# Patient Record
Sex: MALE | Race: WHITE
[De-identification: names, ages, dates, MRNs, and addresses within clinical notes are randomized per-mention and may not be internally consistent; named-entity substitution may affect disease eponyms.]

---

## 2017-06-30 NOTE — EMERGENCY ROOM VISIT NOTE
ED Visit Note


First contact with patient:  17:26


CHIEF COMPLAINT:  Toothache 








HISTORY OF PRESENT ILLNESS:  This 36-year-old male patient presented to the 

emergency department ambulatory with a progressive toothache for past several 

weeks but has been worsening and he has had some facial swelling.  The patient 

believes it is coming from right upper molar.  The pain is now steady and 

severe and radiates to the face.  The patient does not a dentist appointment 

set up and states he does not have insurance or dentist.  They rate their pain 

a 8/10 and the ibuprofen and Tylenol they have been taking has not relieved the 

pain.    The patient denies any discharge from the mouth.





 


REVIEW OF SYSTEMS:   A 10 system review of systems was completed with positives 

and pertinent negatives listed in the HPI.  








ALLERGIES: Penicillins, calamine, bee sting








MEDICATIONS: Patient denies








PMH: Patient denies








SOCIAL HISTORY: The patient lives locally.  He is employed








PHYSICAL EXAM: Vitals are noted on the nurse's note and reviewed by myself.  

Vital signs stable.  Temperature 36.6C orally.  GENERAL: This is a 36-year-old 

male, in no acute distress, nondiaphoretic, well-developed well-nourished.   

Mouth:  The right upper tooth is very carious and the gum is swollen and tender 

around it, without any discharge or signs of an abscess.  The remainder of the 

pharynx and tonsils are without erythema, edema, or exudate.  The airway is 

patent.  There is no facial swelling, cervical or submandibular 

lymphadenopathy.  The patient appears uncomfortable and in pain.  The patient 

has overall very poor dental hygiene.





ED COURSE: The patient was seen and examined.  Previous visits were reviewed.  

The patient has very poor dentition.  He does have some mild facial swelling.  

He is afebrile and nontoxic in appearance.  There is no swelling beneath the 

tongue to suggest Hernandez's angina.  The patient will be given prescriptions for 

clindamycin and Norco.  He was given information for [facility] in 

medicine, Dr. Richter and Dr. Goldberg.  He should follow-up with the dentist for 

definitive management.  He should return to the ER with any worsening symptoms.





    


DIAGNOSIS:  Odontalgia








DISCHARGE INSTRUCTIONS & TREATMENT: Clindamycin 4 times daily for 10 days.Norco 

one tablet every 6 hours if needed for worse pain.  Do not drink or drive while 

taking Norco and do not take with Tylenol.  Followup with a dentist for 

definitive management of your tooth.  Return with high fevers, worsening pain 

or swelling.


Current/Historical Medications


Scheduled


Clindamycin Hcl (Cleocin), 300 MG PO QID





Scheduled PRN


Hydrocodone/Acetaminophen 5MG/325MG (Norco 5MG/325MG), 1 TABLET PO Q6 PRN for 

Pain


Ibuprofen (Advil), 200-600 MG PO Q4H PRN for Pain





Allergies


Coded Allergies:  


     Calamine (Verified  Allergy, Mild, WORSENING RASH, 11/2/16)


     Penicillins (Unverified  Allergy, Mild, 11/2/16)


     BEE STING (Verified  Allergy, Unknown, UNKNOWN, 11/2/16)





Vital Signs











  Date Time  Temp Pulse Resp B/P (MAP) Pulse Ox O2 Delivery O2 Flow Rate FiO2


 


6/30/17 18:00  86 18 150/85 97   


 


6/30/17 17:19 36.6 82 18 145/91 96 Room Air  











Departure Information


Impression





 Primary Impression:  


 Dental caries


 Additional Impression:  


 Dental abscess





Dispostion


Home / Self-Care





Condition


GOOD





Prescriptions





Clindamycin Hcl (CLEOCIN) 300 Mg Cap


300 MG PO QID for 10 Days, #40 CAP


   Prov: Ivelisse Chand PA-C         6/30/17 


Hydrocodone/Acetaminophen 5MG/325MG (Norco 5MG/325MG)  Tab


1 TABLET PO Q6 Y for Pain, #12 TAB


   For Initial Treatment


   Prov: Ivelisse Chand PA-C         6/30/17





Referrals


Reny Talaamntes D.O. (PCP)








Nitesh Goldberg M.D.





Patient Instructions


[facility]





Additional Instructions





Clindamycin 4 times daily for 10 days.Norco one tablet every 6 hours if needed 

for worse pain.  Do not drink or drive while taking Norco and do not take with 

Tylenol.  Followup with a dentist for definitive management of your tooth.  

Return with high fevers, worsening pain or swelling.





Problem Qualifiers

## 2017-07-07 NOTE — DIAGNOSTIC IMAGING REPORT
CHEST 2 VIEWS ROUTINE



CLINICAL HISTORY: Left-sided chest pain    



COMPARISON STUDY:  3/13/2012



FINDINGS: The cardiac and mediastinal contours are normal. There is no evidence

of focal pulmonary consolidation. There is no evidence of failure. No pleural

effusions are visualized.[ No pneumothorax is visualized.



IMPRESSION: No active disease in the chest.







Electronically signed by:  Rafael Shah M.D.

7/7/2017 6:21 PM



Dictated Date/Time:  7/7/2017 6:20 PM

## 2017-07-07 NOTE — EMERGENCY ROOM VISIT NOTE
ED Visit Note


First contact with patient:  17:12


CHIEF COMPLAINT: Left-sided chest pain times one day





HISTORY OF PRESENT ILLNESS: Patient is an otherwise healthy 36-year-old white 

male who presents to the emergency department for evaluation of left-sided 

chest pain.  He reports that his pain started abruptly yesterday around noon.  

He states that he suddenly developed some left-sided nasal congestion and a 

runny nose.  He also developed a dull, constant, aching left-sided chest pain.  

It hurts to the touch also hurts with coughing and deep breathing.  He has had 

a slight cough.  He denies any shortness of breath.  He did not take any 

medications, nor perform any interventions for his symptoms.  He denies any 

direct trauma to the area, no unusual activity or heavy lifting which could 

have caused his symptoms.  He feels sweaty and clammy at times, but denies any 

headache, lightheadedness, dizziness, nausea or vomiting.  Pain does not 

radiate to the back, the jaw or the shoulder.  He has been on clindamycin for 

about a week for a dental abscess which she states is improving.





REVIEW OF SYSTEMS:Review of systems as per HPI.  All other systems reviewed 

were negative.  10 systems reviewed.





PMH:  Electronic medical records are reviewed and summarized as above/below.  

See Problem List.





SOCIAL HISTORY:  Patient lives at home.  He is employed.  Smoker.   





PHYSICAL EXAM: Vital Signs: 


CONSTITUTIONAL: Patient is a well-appearing 36-year-old white male who is awake 

and alert and in no acute distress.  Vital signs are stable.


EYES:  Pupils equal, round, reactive to light and accommodation.  EOMs intact 

without nystagmus.  Sclera are anicteric. 


ENT:  Tympanic membranes intact, with normal landmarks.  External canals are 

clear.  Oral and nasopharynx are clear.  Mucous membranes are moist, no lesions

, tongue and gums appear normal. Poor dentition.


NECK: Supple without lymphadenopathy.  No thyromegaly.  No meningeal signs.  

Full active range of motion without discomfort.


CARDIOVASCULAR: Regular rate and rhythm, with normal S1 and S2, no murmur or 

gallop or rub is heard.  No carotid bruits auscultated.  No JVD.  Peripheral 

pulses easily palpable.  He has reproducible discomfort to palpation over the 

left costochondral border.


RESPIRATORY: Breath sounds equal and clear to auscultation without wheezes, 

rales, or rhonchi heard.   Full and equal chest expansion without accessory 

muscle use or retractions. 


ABDOMEN: Bowel sounds are present.  Abdomen is soft, nontender and nondistended.


INTEGUMENTARY: No lesions or rash, normal skin turgor. 


LYMPH: No lymphadenopathy.





EMERGENCY DEPARTMENT COURSE: Patient was seen and assessed as above.  His old 

records are reviewed.  IV lock was initiated and he was medicated with Toradol 

30 mg IV.  Chest x-ray was obtained and was unremarkable.  EKG was performed 

and was as noted below.  CBC with differential, sedimentation rate, coags, 

cardiac enzymes, CMP and point-of-care troponin and d-dimer were drawn.


Laboratory studies demonstrated a normal white count, H&H, platelet count and 

coags.  There is very slight slight elevation, nonspecific of his sedimentation 

rate.  Electrolytes, renal functions, liver functions are all within normal 

limits.  Troponin is negative 1 and Cardec enzymes are not elevated.  Point-of-

care d-dimer was normal, therefore further workup for PE was not pursued.





All laboratory and diagnostic imaging studies were reviewed with the patient.  

He has reproducible left chest wall tenderness, and I suspect his symptoms are 

likely musculoskeletal in nature.  Conservative care measures were discussed.  

He was encouraged to apply heat to the area and to take an anti-inflammatory 

medicine.  The patient rated his pain a 5/10 at discharge.





EKG: Normal sinus rhythm 76 beats per minute, no ectopy or acute ischemic 

changes, no change on review of prior EKGs.





CHEST 2 VIEWS ROUTINE





CLINICAL HISTORY: Left-sided chest pain    





COMPARISON STUDY:  3/13/2012





FINDINGS: The cardiac and mediastinal contours are normal. There is no evidence


of focal pulmonary consolidation. There is no evidence of failure. No pleural


effusions are visualized.[ No pneumothorax is visualized.





IMPRESSION: No active disease in the chest.








Differential diagnosis includes acute myocardial infarction, acute coronary 

syndrome, myocarditis, pericarditis, pericardial effusions /tamponade, 

esophageal perforation,   pulmonary embolism, pneumonia, pneumothorax, 

cardiomyopathy, congestive heart failure, anemia , COPD/asthma exacerbation, 

musculoskeletal, anxiety, costochondritis,.


Problem List


Medical Problems:


(1) Ankle pain


Status: Resolved  





(2) Bilateral calcaneal fractures


Status: Resolved  





(3) Bilateral calcaneal fractures


Status: Resolved  





(4) Contusion of foot


Status: Resolved  





(5) Dental abscess


Status: Resolved  





(6) Dental caries


Status: Resolved  





(7) Dental caries


Status: Resolved  





(8) Fall


Status: Resolved  





(9) Foot pain


Status: Resolved  





(10) Foot pain


Status: Resolved  





(11) Heel pain, bilateral


Status: Resolved  





(12) Leg pain, bilateral


Status: Resolved  





(13) Lumbar contusion


Status: Resolved  





(14) Lumbar contusion


Status: Resolved  





(15) Lumbar strain


Status: Resolved  





(16) Odontalgia


Status: Resolved  





(17) Osteopenia


Status: Resolved  





(18) Periapical abscess


Status: Resolved  





(19) Pre-syncope


Status: Resolved  





Surgical Problems:


(1) History of orthopedic surgery


Status: Resolved  











Current/Historical Medications


Scheduled


Clindamycin Hcl (Cleocin), 300 MG PO QID





Scheduled PRN


Ibuprofen (Advil), 200-600 MG PO Q4H PRN for Pain





Allergies


Coded Allergies:  


     Calamine (Verified  Allergy, Mild, WORSENING RASH, 7/7/17)


     Penicillins (Unverified  Allergy, Mild, 7/7/17)


     BEE STING (Verified  Allergy, Unknown, UNKNOWN, 7/7/17)





Vital Signs











  Date Time  Temp Pulse Resp B/P (MAP) Pulse Ox O2 Delivery O2 Flow Rate FiO2


 


7/7/17 19:20  78 18 129/86 98   


 


7/7/17 18:09  70      


 


7/7/17 16:51 36.6 99 20 131/94 97 Room Air  











Laboratory Results


7/7/17 17:50








Red Blood Count 4.89, Mean Corpuscular Volume 96.3, Mean Corpuscular Hemoglobin 

32.9, Mean Corpuscular Hemoglobin Concent 34.2, Mean Platelet Volume 10.7, 

Neutrophils (%) (Auto) 58.5, Lymphocytes (%) (Auto) 28.9, Monocytes (%) (Auto) 

10.5, Eosinophils (%) (Auto) 1.7, Basophils (%) (Auto) 0.4, Neutrophils # (Auto

) 4.85, Lymphocytes # (Auto) 2.39, Monocytes # (Auto) 0.87, Eosinophils # (Auto

) 0.14, Basophils # (Auto) 0.03





7/7/17 17:50

















Test


  7/7/17


17:50 7/7/17


17:54


 


White Blood Count


  8.28 K/uL


(4.8-10.8) 


 


 


Red Blood Count


  4.89 M/uL


(4.7-6.1) 


 


 


Hemoglobin


  16.1 g/dL


(14.0-18.0) 


 


 


Hematocrit 47.1 % (42-52)  


 


Mean Corpuscular Volume


  96.3 fL


() 


 


 


Mean Corpuscular Hemoglobin


  32.9 pg


(25-34) 


 


 


Mean Corpuscular Hemoglobin


Concent 34.2 g/dl


(32-36) 


 


 


Platelet Count


  222 K/uL


(130-400) 


 


 


Mean Platelet Volume


  10.7 fL


(7.4-10.4) 


 


 


Neutrophils (%) (Auto) 58.5 %  


 


Lymphocytes (%) (Auto) 28.9 %  


 


Monocytes (%) (Auto) 10.5 %  


 


Eosinophils (%) (Auto) 1.7 %  


 


Basophils (%) (Auto) 0.4 %  


 


Neutrophils # (Auto)


  4.85 K/uL


(1.4-6.5) 


 


 


Lymphocytes # (Auto)


  2.39 K/uL


(1.2-3.4) 


 


 


Monocytes # (Auto)


  0.87 K/uL


(0.11-0.59) 


 


 


Eosinophils # (Auto)


  0.14 K/uL


(0-0.5) 


 


 


Basophils # (Auto)


  0.03 K/uL


(0-0.2) 


 


 


RDW Standard Deviation


  46.7 fL


(36.4-46.3) 


 


 


RDW Coefficient of Variation


  13.2 %


(11.5-14.5) 


 


 


Immature Granulocyte % (Auto) 0.0 %  


 


Immature Granulocyte # (Auto)


  0.00 K/uL


(0.00-0.02) 


 


 


Erythrocyte Sedimentation Rate


  18 mm/hr


(0-14) 


 


 


Prothrombin Time


  10.1 SECONDS


(9.0-12.0) 


 


 


Prothromb Time International


Ratio 0.9 (0.9-1.1) 


  


 


 


Activated Partial


Thromboplast Time 25.6 SECONDS


(21.0-31.0) 


 


 


Partial Thromboplastin Ratio 1.0  


 


Anion Gap


  6.0 mmol/L


(3-11) 


 


 


Est Creatinine Clear Calc


Drug Dose 98.8 ml/min 


  


 


 


Estimated GFR (


American) 111.7 


  


 


 


Estimated GFR (Non-


American 96.4 


  


 


 


BUN/Creatinine Ratio 15.7 (10-20)  


 


Calcium Level


  9.5 mg/dl


(8.5-10.1) 


 


 


Total Bilirubin


  0.3 mg/dl


(0.2-1) 


 


 


Aspartate Amino Transf


(AST/SGOT)  U/L (15-37) 


  


 


 


Alanine Aminotransferase


(ALT/SGPT) 21 U/L (12-78) 


  


 


 


Alkaline Phosphatase


  93 U/L


() 


 


 


Total Creatine Kinase  U/L ()  


 


Creatine Kinase MB


  2.0 ng/ml


(0.5-3.6) 


 


 


Creatine Kinase MB Ratio  (0-3.0)  


 


Total Protein


  7.4 gm/dl


(6.4-8.2) 


 


 


Albumin


  4.0 gm/dl


(3.4-5.0) 


 


 


Globulin


  3.4 gm/dl


(2.5-4.0) 


 


 


Albumin/Globulin Ratio 1.2 (0.9-2)  


 


Chemistry Specimen Hemolysis   


 


Bedside D-Dimer


  


  182 ng/mlFEU


(0-450)


 


Bedside Troponin I


  


  < 0.030 ng/ml


(0-0.045)











Medications Administered











 Medications


  (Trade)  Dose


 Ordered  Sig/Henrietta


 Route  Start Time


 Stop Time Status Last Admin


Dose Admin


 


 Ketorolac


 Tromethamine


  (Toradol Inj)  30 mg  NOW  STAT


 IV  7/7/17 17:30


 7/7/17 17:32 DC 7/7/17 17:57


30 MG











Departure Information


Impression





 Primary Impression:  


 Chest wall pain





Referrals


Reny Talamantes D.O. (PCP)





Patient Instructions


[facility]





Additional Instructions





Ibuprofen(Motrin, Advil) may be used for fever or pain.  Use 600mg every six 

hours as needed.  Take with food.  Avoid using more than 2400mg in a 24 hour 

period.  Do not use 2400mg per day for more than three consecutive days without 

physician direction.  Prolonged inappropriate use can lead to stomach upset or 

ulcers. 


(AND/OR)


Acetaminophen(Tylenol) may be used for fever or pain.  Use 1000mg every six 

hours as needed.  Avoid using more than 3000mg in a 24 hour period.  





Heat to the affected area.





Rest and drink plenty of fluids as tolerated.





Continue current medications.





Avoid strenuous activities and anything that worsens your pain.  Resume normal 

activities once your symptoms resolve.    





Return to the ER immediately for worsening or persistent chest pain, abdominal 

pain, vomiting, fevers, chest pains, difficulty breathing, worsening of your 

condition, or as needed.





Follow up with your primary physician in 2-3 days for a recheck of your current 

condition.

## 2018-05-07 ENCOUNTER — HOSPITAL ENCOUNTER (EMERGENCY)
Dept: HOSPITAL 45 - C.EDB | Age: 38
Discharge: HOME | End: 2018-05-07
Payer: SELF-PAY

## 2018-05-07 VITALS — HEART RATE: 62 BPM | DIASTOLIC BLOOD PRESSURE: 75 MMHG | SYSTOLIC BLOOD PRESSURE: 113 MMHG | OXYGEN SATURATION: 99 %

## 2018-05-07 VITALS
BODY MASS INDEX: 22.69 KG/M2 | WEIGHT: 149.69 LBS | WEIGHT: 149.69 LBS | HEIGHT: 67.99 IN | BODY MASS INDEX: 22.69 KG/M2 | HEIGHT: 67.99 IN

## 2018-05-07 VITALS — TEMPERATURE: 98.06 F

## 2018-05-07 DIAGNOSIS — Z88.0: ICD-10-CM

## 2018-05-07 DIAGNOSIS — M54.5: Primary | ICD-10-CM

## 2018-05-07 DIAGNOSIS — Z88.8: ICD-10-CM

## 2018-05-07 DIAGNOSIS — Z87.828: ICD-10-CM

## 2018-05-07 DIAGNOSIS — F17.200: ICD-10-CM

## 2018-05-07 DIAGNOSIS — Z91.030: ICD-10-CM

## 2018-05-07 LAB
ALBUMIN SERPL-MCNC: 4.2 GM/DL (ref 3.4–5)
ALP SERPL-CCNC: 87 U/L (ref 45–117)
ALT SERPL-CCNC: 17 U/L (ref 12–78)
AST SERPL-CCNC: 14 U/L (ref 15–37)
BASOPHILS # BLD: 0.03 K/UL (ref 0–0.2)
BASOPHILS NFR BLD: 0.3 %
BUN SERPL-MCNC: 14 MG/DL (ref 7–18)
CALCIUM SERPL-MCNC: 8.7 MG/DL (ref 8.5–10.1)
CO2 SERPL-SCNC: 29 MMOL/L (ref 21–32)
CREAT SERPL-MCNC: 0.99 MG/DL (ref 0.6–1.4)
EOS ABS #: 0.07 K/UL (ref 0–0.5)
EOSINOPHIL NFR BLD AUTO: 221 K/UL (ref 130–400)
GLUCOSE SERPL-MCNC: 75 MG/DL (ref 70–99)
HCT VFR BLD CALC: 43.5 % (ref 42–52)
HGB BLD-MCNC: 15.6 G/DL (ref 14–18)
IG#: 0.02 K/UL (ref 0–0.02)
IMM GRANULOCYTES NFR BLD AUTO: 23.3 %
LIPASE: 128 U/L (ref 73–393)
LYMPHOCYTES # BLD: 2.25 K/UL (ref 1.2–3.4)
MCH RBC QN AUTO: 33.3 PG (ref 25–34)
MCHC RBC AUTO-ENTMCNC: 35.9 G/DL (ref 32–36)
MCV RBC AUTO: 92.9 FL (ref 80–100)
MONO ABS #: 0.79 K/UL (ref 0.11–0.59)
MONOCYTES NFR BLD: 8.2 %
NEUT ABS #: 6.5 K/UL (ref 1.4–6.5)
NEUTROPHILS # BLD AUTO: 0.7 %
NEUTROPHILS NFR BLD AUTO: 67.3 %
PMV BLD AUTO: 10.8 FL (ref 7.4–10.4)
POTASSIUM SERPL-SCNC: 4.1 MMOL/L (ref 3.5–5.1)
PROT SERPL-MCNC: 8 GM/DL (ref 6.4–8.2)
RED CELL DISTRIBUTION WIDTH CV: 13.2 % (ref 11.5–14.5)
RED CELL DISTRIBUTION WIDTH SD: 44.9 FL (ref 36.4–46.3)
SODIUM SERPL-SCNC: 139 MMOL/L (ref 136–145)
WBC # BLD AUTO: 9.66 K/UL (ref 4.8–10.8)

## 2018-05-07 NOTE — EMERGENCY ROOM VISIT NOTE
History


Report prepared by Emmett:  Yahir Sorensen


Under the Supervision of:  HAKEEM ObandoO.


First contact with patient:  14:23


Chief Complaint:  FLANK PAIN


Stated Complaint:  KIDNEY PAIN, SOB





History of Present Illness


The patient is a 37 year old male who presents to the Emergency Room with 

complaints of waxing and waning flank pain and shortness of breath that began 

this morning. The patient states that his symptoms began with lower abdominal 

pains this morning, and worsened to his flank pain. He states that his 

shortness of breath is secondary to the pain taking his breath away.  Pain is 

sharp stabbing constant in the lower back.  10 out of 10 currently.  Movement 

worsens his symptoms i.e. twisting turning and bending.  He denies any other 

headache, change in vision, fevers, chest pain, nausea, vomiting, diarrhea, 

pain with urination, and melena.





   Source of History:  patient


   Onset:  This morning


   Position:  abdomen, back (Flanks)


   Timing:  waxes/wanes


   Associated Symptoms:  + SOB, + abdominal pain, No nausea, No vomiting





Review of Systems


See HPI for pertinent positives & negatives. A total of 10 systems reviewed and 

were otherwise negative.





Past Medical & Surgical


Medical Problems:


(1) Ankle pain


(2) Bilateral calcaneal fractures


(3) Bilateral calcaneal fractures


(4) Contusion of foot


(5) Dental abscess


(6) Dental caries


(7) Dental caries


(8) Fall


(9) Foot pain


(10) Foot pain


(11) Heel pain, bilateral


(12) Leg pain, bilateral


(13) Lumbar contusion


(14) Lumbar contusion


(15) Lumbar strain


(16) No Known Active Medical Problems


(17) Odontalgia


(18) Osteopenia


(19) Periapical abscess


(20) Pre-syncope


Surgical Problems:


(1) History of orthopedic surgery








Family History





Cancer


Diabetes mellitus


Hypertension


Lung disease


Seizures





Social History


Smoking Status:  Current Every Day Smoker


Alcohol Use:  occasionally


Marital Status:  in relationship


Housing Status:  lives with family


Occupation Status:  unemployed





Current/Historical Medications


No Active Prescriptions or Reported Meds





Allergies


Coded Allergies:  


     Calamine (Verified  Allergy, Mild, WORSENING RASH, 5/7/18)


     Penicillins (Unverified  Allergy, Mild, 5/7/18)


     BEE STING (Verified  Allergy, Unknown, UNKNOWN, 5/7/18)





Physical Exam


Vital Signs











  Date Time  Temp Pulse Resp B/P (MAP) Pulse Ox O2 Delivery O2 Flow Rate FiO2


 


5/7/18 15:19  62 16 113/75 99 Room Air  


 


5/7/18 13:50 36.7 101 18 130/81 97 Room Air  











Physical Exam


GENERAL: Sitting up in bed, alert, disheveled appearing, holding lower back, 

well nourished, no distress, non-toxic 


EYE EXAM: normal conjunctiva. PERRL and EOM's intact.


OROPHARYNX: no exudate, no erythema, lips, buccal mucosa, and tongue normal and 

mucous membranes are moist


NECK: supple, no nuchal rigidity, no adenopathy, non-tender


LUNGS: Clear to auscultation. Normal chest wall mechanics


HEART: no murmurs, S1 normal and S2 normal 


ABDOMEN: abdomen soft, non-tender, normo-active bowel sounds, no masses, no 

rebound or guarding. 


BACK: Back is symmetrical on inspection and there is no deformity, no midline 

tenderness, no CVA tenderness. There is lower lumbar paraspinal tenderness 

bilaterally. 


SKIN: no rashes and no bruising 


UPPER EXTREMITIES: upper extremities are grossly normal. 


LOWER EXTREMITIES: No pitting edema. Flexion/extension of hips, knees, ankles, 

and EHL are 5/5 bilaterally.


NEURO EXAM: Normal sensorium, cranial nerves II-XII  intact, normal speech,  no

  weakness of arm. Gross sensation intact.





Medical Decision & Procedures


ER Provider


Diagnostic Interpretation:


Radiology results as stated below per my review and the radiologist's 

interpretation: 





ABD/PELVIS WITHOUT FOR STONE





HISTORY:  37 years-old Male flank pain  acute bilateral flank pain





COMPARISON: None available





TECHNIQUE: Multiple axial CT images of the abdomen and pelvis were obtained


without the use of IV contrast A dose lowering technique was used consistent


with the principals of ALARA.





FINDINGS: 


Lung bases are generally clear. There is no pneumatosis or pneumoperitoneum


identified. Imaged inferior cardiac chambers are unremarkable.





Liver, spleen, gallbladder, pancreas and adrenal glands are within normal


limits. Kidneys, ureters are unremarkable without obstructing calculi or


obstructive uropathy identified. Mild wall thickening of the bladder with


partial distention. Calcifications are seen within the central prostate. Aorta


is normal in course and caliber. No bulky adenopathy.





No bowel obstruction or focal bowel wall thickening. Normal appendix. Minimal


noninflamed colonic diverticula. Soft tissues are unremarkable. The bones appear


intact. Orthopedic hardware of the left hip appears intact.





IMPRESSION: 


1. No acute intra-abdominal or intrapelvic abnormality identified, specifically


no renal calculi or obstructive uropathy.


2. Minimal colonic diverticulosis without diverticulitis.


3. Normal appendix.


4. Mild wall thickening of the bladder is likely secondary to partial


distention. Correlate with urinalysis to exclude cystitis. 











The above report was generated using voice recognition software. It may contain


grammatical, syntax or spelling errors.











Electronically signed by:  Marcell Longoria M.D.


5/7/2018 3:17 PM





Dictated Date/Time:  5/7/2018 3:11 PM





Laboratory Results


5/7/18 14:34








Red Blood Count 4.68, Mean Corpuscular Volume 92.9, Mean Corpuscular Hemoglobin 

33.3, Mean Corpuscular Hemoglobin Concent 35.9, Mean Platelet Volume 10.8, 

Neutrophils (%) (Auto) 67.3, Lymphocytes (%) (Auto) 23.3, Monocytes (%) (Auto) 

8.2, Eosinophils (%) (Auto) 0.7, Basophils (%) (Auto) 0.3, Neutrophils # (Auto) 

6.50, Lymphocytes # (Auto) 2.25, Monocytes # (Auto) 0.79, Eosinophils # (Auto) 

0.07, Basophils # (Auto) 0.03





5/7/18 14:34

















Test


  5/7/18


14:34 5/7/18


14:46


 


White Blood Count


  9.66 K/uL


(4.8-10.8) 


 


 


Red Blood Count


  4.68 M/uL


(4.7-6.1) 


 


 


Hemoglobin


  15.6 g/dL


(14.0-18.0) 


 


 


Hematocrit 43.5 % (42-52)  


 


Mean Corpuscular Volume


  92.9 fL


() 


 


 


Mean Corpuscular Hemoglobin


  33.3 pg


(25-34) 


 


 


Mean Corpuscular Hemoglobin


Concent 35.9 g/dl


(32-36) 


 


 


Platelet Count


  221 K/uL


(130-400) 


 


 


Mean Platelet Volume


  10.8 fL


(7.4-10.4) 


 


 


Neutrophils (%) (Auto) 67.3 %  


 


Lymphocytes (%) (Auto) 23.3 %  


 


Monocytes (%) (Auto) 8.2 %  


 


Eosinophils (%) (Auto) 0.7 %  


 


Basophils (%) (Auto) 0.3 %  


 


Neutrophils # (Auto)


  6.50 K/uL


(1.4-6.5) 


 


 


Lymphocytes # (Auto)


  2.25 K/uL


(1.2-3.4) 


 


 


Monocytes # (Auto)


  0.79 K/uL


(0.11-0.59) 


 


 


Eosinophils # (Auto)


  0.07 K/uL


(0-0.5) 


 


 


Basophils # (Auto)


  0.03 K/uL


(0-0.2) 


 


 


RDW Standard Deviation


  44.9 fL


(36.4-46.3) 


 


 


RDW Coefficient of Variation


  13.2 %


(11.5-14.5) 


 


 


Immature Granulocyte % (Auto) 0.2 %  


 


Immature Granulocyte # (Auto)


  0.02 K/uL


(0.00-0.02) 


 


 


Anion Gap


  5.0 mmol/L


(3-11) 


 


 


Est Creatinine Clear Calc


Drug Dose 98.1 ml/min 


  


 


 


Estimated GFR (


American) 112.3 


  


 


 


Estimated GFR (Non-


American 96.9 


  


 


 


BUN/Creatinine Ratio 14.6 (10-20)  


 


Calcium Level


  8.7 mg/dl


(8.5-10.1) 


 


 


Total Bilirubin


  0.3 mg/dl


(0.2-1) 


 


 


Direct Bilirubin


  < 0.1 mg/dl


(0-0.2) 


 


 


Aspartate Amino Transf


(AST/SGOT) 14 U/L (15-37) 


  


 


 


Alanine Aminotransferase


(ALT/SGPT) 17 U/L (12-78) 


  


 


 


Alkaline Phosphatase


  87 U/L


() 


 


 


Total Protein


  8.0 gm/dl


(6.4-8.2) 


 


 


Albumin


  4.2 gm/dl


(3.4-5.0) 


 


 


Lipase


  128 U/L


() 


 


 


Urine Color  YELLOW 


 


Urine Appearance  CLEAR (CLEAR) 


 


Urine pH  6.5 (4.5-7.5) 


 


Urine Specific Gravity


  


  1.012


(1.000-1.030)


 


Urine Protein  NEG (NEG) 


 


Urine Glucose (UA)  NEG (NEG) 


 


Urine Ketones  NEG (NEG) 


 


Urine Occult Blood  NEG (NEG) 


 


Urine Nitrite  NEG (NEG) 


 


Urine Bilirubin  NEG (NEG) 


 


Urine Urobilinogen  NEG (NEG) 


 


Urine Leukocyte Esterase  NEG (NEG) 


 


Urine WBC (Auto)  1-5 /hpf (0-5) 


 


Urine RBC (Auto)  0-4 /hpf (0-4) 


 


Urine Hyaline Casts (Auto)  0 /lpf (0-5) 


 


Urine Epithelial Cells (Auto)  0-5 /lpf (0-5) 


 


Urine Bacteria (Auto)  NEG (NEG) 





Laboratory results per my review.





Medications Administered











 Medications


  (Trade)  Dose


 Ordered  Sig/Henrietta


 Route  Start Time


 Stop Time Status Last Admin


Dose Admin


 


 Sodium Chloride  1,000 ml @ 


 999 mls/hr  Q1H1M STAT


 IV  5/7/18 14:27


 5/7/18 15:27 DC 5/7/18 14:45


999 MLS/HR


 


 Ondansetron HCl


  (Zofran Inj)  4 mg  NOW  STAT


 IV  5/7/18 14:27


 5/7/18 14:29 DC 5/7/18 14:46


4 MG


 


 Ketorolac


 Tromethamine


  (Toradol Inj)  30 mg  NOW  STAT


 IV  5/7/18 14:27


 5/7/18 14:29 DC 5/7/18 14:45


30 MG











ED Course


ED COURSE: 


Vital signs were reviewed and showed normal vitals. 


The patients medical record was reviewed


The above diagnostic studies were performed and reviewed.


ED treatments and interventions as stated above. 





1424: The patient was evaluated in room B12B. A complete history and physical 

examination was performed.





1427: Ordered Toradol 30 mg IV, Zofran 4 mg IV, Sodium Chloride 1000 mL @ 999 mL

/hr IV. 





1358: Upon reevaluation, the patient is resting in bed.I discussed my findings 

with the patient and he understands and agrees with the treatment plan.   


Based on the patients age, coexisting illnesses, exam and lab findings the 

decision to treat as an outpatient was made.


The patient remained stable while under my care.


The patient appeared well at the time of discharge.





Medical Decision


Differential diagnosis:


Etiologies such as renal colic, appendicitis, diverticulitis, mesenteric 

ischemia, aortic pathology, infections, inflammatory bowel disease, PUD, 

biliary pathology, UTI, as well as others were entertained.





Patient is a 37-year-old male who presents to ER for lower back pain.  He has a 

benign abdominal exam.  Vitals are stable.  CBC along with BMP, LFTs, bilirubin 

lipase is normal.  UA was negative.  CT abdomen pelvis was benign.  Patient was 

given IV Toradol.  He did feel significant better.  UA as discussed above was 

unremarkable.  I do favor this likely muscle skeletal with twisting turning 

bending and palpation worsening his symptoms.  He was updated at bedside.  He 

is discharged follow-up with PCP as an outpatient. Discussed with Pt concerning 

signs and symptoms to watch out for. Pt was instructed to follow up with their 

PCP and discussed with the patient their option to return to the ED at anytime 

for persistent or worsening symptoms. The appropriate anticipatory guidance and 

out-patient management, including indications for return to the emergency 

department, were explained at length to the patient and understood.





Medication Reconcilliation


Current Medication List:  was personally reviewed by me





Blood Pressure Screening


Patient's blood pressure:  Normal blood pressure





Impression





 Primary Impression:  


 Back pain





Scribe Attestation


The scribe's documentation has been prepared under my direction and personally 

reviewed by me in its entirety. I confirm that the note above accurately 

reflects all work, treatment, procedures, and medical decision making performed 

by me.





Departure Information


Dispostion


Home / Self-Care





Prescriptions





No Active Prescriptions or Reported Meds





Referrals


Reny Talamantes D.O. (PCP)





Forms


HOME CARE DOCUMENTATION FORM,                                                 

               IMPORTANT VISIT INFORMATION





Patient Instructions


My [facility]





Additional Instructions





Please follow up with your primary care doctor with in the next 24 hours.  Any 

worsening of your symptoms, please return to the ED immediately. This includes 

any fevers greater than 100.4, worsening pain, chest pain, shortness breath, 

persistent nausea, vomiting, unable to eat or drink, or any other concerning 

signs or symptoms from your standpoint.








Please take Tylenol Motrin as needed for pain.





Problem Qualifiers








 Primary Impression:  


 Back pain


 Back pain location:  low back pain  Chronicity:  acute  Back pain laterality:  

bilateral  Sciatica presence:  unspecified whether sciatica present  Qualified 

Codes:  M54.5 - Low back pain

## 2018-05-07 NOTE — DIAGNOSTIC IMAGING REPORT
ABD/PELVIS WITHOUT FOR STONE



HISTORY:  37 years-old Male flank pain  acute bilateral flank pain



COMPARISON: None available



TECHNIQUE: Multiple axial CT images of the abdomen and pelvis were obtained

without the use of IV contrast A dose lowering technique was used consistent

with the principals of KAREEN.



FINDINGS: 

Lung bases are generally clear. There is no pneumatosis or pneumoperitoneum

identified. Imaged inferior cardiac chambers are unremarkable.



Liver, spleen, gallbladder, pancreas and adrenal glands are within normal

limits. Kidneys, ureters are unremarkable without obstructing calculi or

obstructive uropathy identified. Mild wall thickening of the bladder with

partial distention. Calcifications are seen within the central prostate. Aorta

is normal in course and caliber. No bulky adenopathy.



No bowel obstruction or focal bowel wall thickening. Normal appendix. Minimal

noninflamed colonic diverticula. Soft tissues are unremarkable. The bones appear

intact. Orthopedic hardware of the left hip appears intact.



IMPRESSION: 

1. No acute intra-abdominal or intrapelvic abnormality identified, specifically

no renal calculi or obstructive uropathy.

2. Minimal colonic diverticulosis without diverticulitis.

3. Normal appendix.

4. Mild wall thickening of the bladder is likely secondary to partial

distention. Correlate with urinalysis to exclude cystitis. 







The above report was generated using voice recognition software. It may contain

grammatical, syntax or spelling errors.







Electronically signed by:  Marcell Longoria M.D.

5/7/2018 3:17 PM



Dictated Date/Time:  5/7/2018 3:11 PM